# Patient Record
Sex: FEMALE | Race: WHITE | Employment: FULL TIME | URBAN - NONMETROPOLITAN AREA
[De-identification: names, ages, dates, MRNs, and addresses within clinical notes are randomized per-mention and may not be internally consistent; named-entity substitution may affect disease eponyms.]

---

## 2022-02-01 ENCOUNTER — TELEPHONE (OUTPATIENT)
Dept: PRIMARY CARE CLINIC | Age: 37
End: 2022-02-01

## 2022-02-01 NOTE — TELEPHONE ENCOUNTER
----- Message from Tiarra Parekh sent at 2022  8:21 AM EST -----  Subject: Appointment Request    Reason for Call: New Patient Request Appointment    QUESTIONS  Type of Appointment? New Patient/New to Provider  Reason for appointment request? No appointments available during search  Additional Information for Provider? Patient and her  would like to   establish care with Pamela Armando. Please advise. Thanks  ---------------------------------------------------------------------------  --------------  Yarelis Yeh INFO  What is the best way for the office to contact you? OK to leave message on   voicemail  Preferred Call Back Phone Number? [phone]  ---------------------------------------------------------------------------  --------------  SCRIPT ANSWERS  Relationship to Patient? Self  Specialty Confirmation? Primary Care  Is this the first appointment to establish care for a ? No  Have you been diagnosed with, awaiting test results for, or told that you   are suspected of having COVID-19 (Coronavirus)? (If patient has tested   negative or was tested as a requirement for work, school, or travel and   not based on symptoms, answer no)? No  Within the past two weeks have you developed any of the following symptoms   (answer no if symptoms have been present longer than 2 weeks or began   more than 2 weeks ago)? Fever or Chills, Cough, Shortness of breath or   difficulty breathing, Loss of taste or smell, Sore throat, Nasal   congestion, Sneezing or runny nose, Fatigue or generalized body aches   (answer no if pain is specific to a body part e.g. back pain), Diarrhea,   Headache?  Yes

## 2022-02-24 ENCOUNTER — OFFICE VISIT (OUTPATIENT)
Dept: PRIMARY CARE CLINIC | Age: 37
End: 2022-02-24
Payer: COMMERCIAL

## 2022-02-24 VITALS
BODY MASS INDEX: 24.2 KG/M2 | WEIGHT: 163.4 LBS | OXYGEN SATURATION: 98 % | DIASTOLIC BLOOD PRESSURE: 78 MMHG | RESPIRATION RATE: 18 BRPM | SYSTOLIC BLOOD PRESSURE: 112 MMHG | TEMPERATURE: 99 F | HEIGHT: 69 IN | HEART RATE: 77 BPM

## 2022-02-24 DIAGNOSIS — Z76.89 ENCOUNTER TO ESTABLISH CARE: Primary | ICD-10-CM

## 2022-02-24 DIAGNOSIS — U09.9 POST-COVID CHRONIC COUGH: ICD-10-CM

## 2022-02-24 DIAGNOSIS — R05.3 POST-COVID CHRONIC COUGH: ICD-10-CM

## 2022-02-24 PROCEDURE — 99204 OFFICE O/P NEW MOD 45 MIN: CPT | Performed by: NURSE PRACTITIONER

## 2022-02-24 RX ORDER — PREDNISONE 10 MG/1
TABLET ORAL
Qty: 54 TABLET | Refills: 0 | Status: SHIPPED | OUTPATIENT
Start: 2022-02-24

## 2022-02-24 RX ORDER — BENZONATATE 200 MG/1
200 CAPSULE ORAL 3 TIMES DAILY PRN
Qty: 30 CAPSULE | Refills: 0 | Status: SHIPPED | OUTPATIENT
Start: 2022-02-24 | End: 2022-03-03

## 2022-02-24 SDOH — ECONOMIC STABILITY: FOOD INSECURITY: WITHIN THE PAST 12 MONTHS, THE FOOD YOU BOUGHT JUST DIDN'T LAST AND YOU DIDN'T HAVE MONEY TO GET MORE.: NEVER TRUE

## 2022-02-24 SDOH — ECONOMIC STABILITY: FOOD INSECURITY: WITHIN THE PAST 12 MONTHS, YOU WORRIED THAT YOUR FOOD WOULD RUN OUT BEFORE YOU GOT MONEY TO BUY MORE.: NEVER TRUE

## 2022-02-24 ASSESSMENT — SOCIAL DETERMINANTS OF HEALTH (SDOH): HOW HARD IS IT FOR YOU TO PAY FOR THE VERY BASICS LIKE FOOD, HOUSING, MEDICAL CARE, AND HEATING?: NOT HARD AT ALL

## 2022-02-24 ASSESSMENT — ENCOUNTER SYMPTOMS
SHORTNESS OF BREATH: 1
EYES NEGATIVE: 1
COUGH: 1
ALLERGIC/IMMUNOLOGIC NEGATIVE: 1
WHEEZING: 0

## 2022-02-24 ASSESSMENT — PATIENT HEALTH QUESTIONNAIRE - PHQ9
2. FEELING DOWN, DEPRESSED OR HOPELESS: 0
SUM OF ALL RESPONSES TO PHQ9 QUESTIONS 1 & 2: 0
SUM OF ALL RESPONSES TO PHQ QUESTIONS 1-9: 0
1. LITTLE INTEREST OR PLEASURE IN DOING THINGS: 0
SUM OF ALL RESPONSES TO PHQ QUESTIONS 1-9: 0

## 2022-02-24 NOTE — PROGRESS NOTES
[facility]  Sully Mcconnell, [address] [facility]  [address]  Dept: [phone]  Dept Fax: [phone]      Name: Kalani Jones  : 1985         Chief Complaint:     Chief Complaint   Patient presents with    New Patient     Establish care.  Cough     c/o ongoing cough from COVID back in January. Seeking Prenisone. History of Present Illness:      Kalani Jones is a 40 y.o.  female who presents with New Patient (Establish care. ) and Cough (c/o ongoing cough from COVID back in January. Seeking Prenisone. )    Dayna Crowley is here today to establish care. She is concerned about having Covid back in January. She continues to have a nagging cough. Yesterday she coughed so hard she vomited. She states that she still does not have her taste and smell back. States she is short of breath with activity. She has been taking Mucinex. She has some ear pain. She denies any other concerns today. Past Medical History:     History reviewed. No pertinent past medical history. Reviewed all health maintenance requirements and ordered appropriate tests  Health Maintenance Due   Topic Date Due    Depression Screen  Never done       Past Surgical History:     History reviewed. No pertinent surgical history. Medications:       Prior to Admission medications    Medication Sig Start Date End Date Taking? Authorizing Provider   levonorgestrel (MIRENA, 52 MG,) IUD 52 mg 1 each by IntraUTERine route once   Yes Historical Provider, MD   predniSONE (DELTASONE) 10 MG tablet Day 1-4: Take 6 tablets once a day. Day 5-6: Take 5 tablets once a day. Day 7-8: Take 4 tablets once a day. Day 9-10: Take 3 tablets once a day. Day 11-12: Take 2 tablets once a day. Day 13-14: Take 1 tablet once a day.  22  Yes STEPHANI Dougherty CNP   benzonatate (TESSALON) 200 MG capsule Take 1 capsule by mouth 3 times daily as needed for Cough 2/24/22 3/3/22 Yes Kadie Rendon APRN - CNP        Allergies:       Patient has no known allergies. Social History:     Tobacco:    reports that she has quit smoking. She has never used smokeless tobacco.  Alcohol:      reports current alcohol use. Drug Use:  reports no history of drug use. Family History:     History reviewed. No pertinent family history. Review of Systems:     Positive and Negative as described in HPI    Review of Systems   Constitutional: Negative. HENT: Positive for ear pain. Eyes: Negative. Respiratory: Positive for cough and shortness of breath. Negative for wheezing. Cardiovascular: Negative. Endocrine: Negative. Genitourinary: Negative. Musculoskeletal: Negative. Allergic/Immunologic: Negative. Neurological: Negative. Hematological: Negative. Psychiatric/Behavioral: Negative. Physical Exam:   Vitals:  /78   Pulse 77   Temp 99 °F (37.2 °C) (Temporal)   Resp 18   Ht 5' 9\" (1.753 m)   Wt 163 lb 6.4 oz (74.1 kg)   SpO2 98%   BMI 24.13 kg/m²     Physical Exam  Vitals and nursing note reviewed. Constitutional:       Appearance: Normal appearance. HENT:      Head: Normocephalic. Right Ear: Tympanic membrane normal.      Left Ear: Tympanic membrane normal.      Nose: Nose normal.      Mouth/Throat:      Mouth: Mucous membranes are moist.      Pharynx: Oropharynx is clear. Eyes:      Conjunctiva/sclera: Conjunctivae normal.      Pupils: Pupils are equal, round, and reactive to light. Cardiovascular:      Rate and Rhythm: Normal rate and regular rhythm. Heart sounds: Normal heart sounds. No murmur heard. Pulmonary:      Effort: Pulmonary effort is normal. No respiratory distress. Breath sounds: Normal breath sounds. No wheezing. Comments: Harsh cough with deep inspiration  Musculoskeletal:         General: Normal range of motion. Cervical back: Normal range of motion. Skin:     General: Skin is warm.       Capillary Refill: Capillary 2/24/2023).

## 2022-02-24 NOTE — PATIENT INSTRUCTIONS
SURVEY:     You may be receiving a survey from [employer] regarding your visit today. Please complete the survey to enable us to provide the highest quality of care to you and your family. If you cannot score us a very good on any question, please call the office to discuss how we could have made your experience a very good one. Thank you.   Tete Keane, APRN-MARIO Hawthorne Ace, CNP  Jerica Messina, LPN  Dada Baxter, DINORAHN  Judith Yanes, ANAID Dietz, ANAID Fermin, CMA  Olive, PCA

## 2022-12-08 DIAGNOSIS — B96.89 BACTERIAL CONJUNCTIVITIS OF BOTH EYES: Primary | ICD-10-CM

## 2022-12-08 DIAGNOSIS — H10.9 BACTERIAL CONJUNCTIVITIS OF BOTH EYES: Primary | ICD-10-CM

## 2022-12-08 RX ORDER — POLYMYXIN B SULFATE AND TRIMETHOPRIM 1; 10000 MG/ML; [USP'U]/ML
1 SOLUTION OPHTHALMIC EVERY 4 HOURS
Qty: 10 ML | Refills: 0 | Status: SHIPPED | OUTPATIENT
Start: 2022-12-08 | End: 2022-12-15

## 2023-02-27 ENCOUNTER — OFFICE VISIT (OUTPATIENT)
Dept: PRIMARY CARE CLINIC | Age: 38
End: 2023-02-27
Payer: COMMERCIAL

## 2023-02-27 VITALS
HEART RATE: 81 BPM | HEIGHT: 69 IN | WEIGHT: 153 LBS | DIASTOLIC BLOOD PRESSURE: 72 MMHG | RESPIRATION RATE: 18 BRPM | BODY MASS INDEX: 22.66 KG/M2 | SYSTOLIC BLOOD PRESSURE: 112 MMHG | OXYGEN SATURATION: 99 % | TEMPERATURE: 99 F

## 2023-02-27 DIAGNOSIS — Z00.00 WELLNESS EXAMINATION: Primary | ICD-10-CM

## 2023-02-27 DIAGNOSIS — Z13.220 LIPID SCREENING: ICD-10-CM

## 2023-02-27 DIAGNOSIS — R53.83 OTHER FATIGUE: ICD-10-CM

## 2023-02-27 DIAGNOSIS — L30.9 DERMATITIS: ICD-10-CM

## 2023-02-27 PROCEDURE — 99214 OFFICE O/P EST MOD 30 MIN: CPT | Performed by: NURSE PRACTITIONER

## 2023-02-27 RX ORDER — PIMECROLIMUS 10 MG/G
CREAM TOPICAL 2 TIMES DAILY
Qty: 60 G | Refills: 0 | Status: SHIPPED | OUTPATIENT
Start: 2023-02-27 | End: 2023-03-06

## 2023-02-27 SDOH — ECONOMIC STABILITY: FOOD INSECURITY: WITHIN THE PAST 12 MONTHS, THE FOOD YOU BOUGHT JUST DIDN'T LAST AND YOU DIDN'T HAVE MONEY TO GET MORE.: NEVER TRUE

## 2023-02-27 SDOH — ECONOMIC STABILITY: HOUSING INSECURITY
IN THE LAST 12 MONTHS, WAS THERE A TIME WHEN YOU DID NOT HAVE A STEADY PLACE TO SLEEP OR SLEPT IN A SHELTER (INCLUDING NOW)?: NO

## 2023-02-27 SDOH — ECONOMIC STABILITY: INCOME INSECURITY: HOW HARD IS IT FOR YOU TO PAY FOR THE VERY BASICS LIKE FOOD, HOUSING, MEDICAL CARE, AND HEATING?: NOT HARD AT ALL

## 2023-02-27 SDOH — ECONOMIC STABILITY: FOOD INSECURITY: WITHIN THE PAST 12 MONTHS, YOU WORRIED THAT YOUR FOOD WOULD RUN OUT BEFORE YOU GOT MONEY TO BUY MORE.: NEVER TRUE

## 2023-02-27 ASSESSMENT — PATIENT HEALTH QUESTIONNAIRE - PHQ9
2. FEELING DOWN, DEPRESSED OR HOPELESS: 0
1. LITTLE INTEREST OR PLEASURE IN DOING THINGS: 0
SUM OF ALL RESPONSES TO PHQ9 QUESTIONS 1 & 2: 0
SUM OF ALL RESPONSES TO PHQ QUESTIONS 1-9: 0

## 2023-02-27 ASSESSMENT — ENCOUNTER SYMPTOMS
GASTROINTESTINAL NEGATIVE: 1
RESPIRATORY NEGATIVE: 1
EYES NEGATIVE: 1

## 2023-02-27 NOTE — PROGRESS NOTES
[facility]  Daiana Hurt, [address] [facility]  [address]  Dept: [phone]  Dept Fax: [phone]      Name: Nori Leventhal  : 1985         Chief Complaint:     Chief Complaint   Patient presents with    Otalgia    Fatigue     Patient is wanting a CBC and thyroid checked        History of Present Illness:      Nori Leventhal is a 41 y.o.  female who presents with Otalgia and Fatigue (Patient is wanting a CBC and thyroid checked )    LEI Siegel is here today for a routine office visit. She states she has been having increased fatigue and feels like she can sleep all the time. She has been taking vitamin supplements. She also has been having increased dry patches of skin on her face. She states she feels like she has chills a lot. Denies any family history of thyroid disease but would like her thyroid checked. She is using OTC moisturizers that she states will help the symptoms but the dryness continues. She feels like she has fluid in her ears since having Covid. She has taken Benadryl and sometimes will take Zyrtec with no improvement. Past Medical History:     No past medical history on file. Reviewed all health maintenance requirements and ordered appropriate tests  Health Maintenance Due   Topic Date Due    Depression Screen  2023       Past Surgical History:     No past surgical history on file. Medications:       Prior to Admission medications    Medication Sig Start Date End Date Taking? Authorizing Provider   pimecrolimus (ELIDEL) 1 % cream Apply topically 2 times daily for 7 days Apply topically 2 times daily. 2/27/23 3/6/23 Yes STEPHANI Herbert - CNP   levonorgestrel (MIRENA, 52 MG,) IUD 52 mg 1 each by IntraUTERine route once   Yes Historical Provider, MD        Allergies:       Patient has no known allergies. Social History:     Tobacco:    reports that she has quit smoking.  She has never used smokeless tobacco.  Alcohol:      reports current alcohol use. Drug Use:  reports no history of drug use. Family History:     No family history on file. Review of Systems:     Positive and Negative as described in HPI    Review of Systems   Constitutional:  Positive for fatigue. Negative for fever. HENT: Negative. Eyes: Negative. Respiratory: Negative. Cardiovascular: Negative. Gastrointestinal: Negative. Endocrine: Positive for cold intolerance. Genitourinary: Negative. Musculoskeletal: Negative. Skin:  Positive for rash. Allergic/Immunologic: Positive for environmental allergies. Neurological: Negative. Hematological: Negative. Psychiatric/Behavioral: Negative. Physical Exam:   Vitals:  /72 (Site: Left Upper Arm, Position: Sitting)   Pulse 81   Temp 99 °F (37.2 °C) (Temporal)   Resp 18   Ht 5' 9\" (1.753 m)   Wt 153 lb (69.4 kg)   SpO2 99%   BMI 22.59 kg/m²     Physical Exam  Vitals and nursing note reviewed. Constitutional:       General: She is not in acute distress. Appearance: Normal appearance. She is normal weight. HENT:      Head: Normocephalic. Right Ear: Tympanic membrane normal.      Left Ear: Tympanic membrane normal.      Ears:      Comments: Clear fluid bilaterally     Nose: Nose normal.      Mouth/Throat:      Lips: Pink. Mouth: Mucous membranes are moist.      Pharynx: Oropharynx is clear. Eyes:      Extraocular Movements: Extraocular movements intact. Conjunctiva/sclera: Conjunctivae normal.      Pupils: Pupils are equal, round, and reactive to light. Cardiovascular:      Rate and Rhythm: Normal rate and regular rhythm. Heart sounds: Normal heart sounds. No murmur heard. Pulmonary:      Effort: Pulmonary effort is normal.      Breath sounds: Normal breath sounds. Musculoskeletal:         General: Normal range of motion. Cervical back: Normal range of motion and neck supple. No tenderness.    Lymphadenopathy: Cervical: No cervical adenopathy. Skin:     General: Skin is warm. Capillary Refill: Capillary refill takes less than 2 seconds. Comments: Small patch of erythema and dryness on right side of nose fold. Neurological:      General: No focal deficit present. Mental Status: She is alert and oriented to person, place, and time. Psychiatric:         Mood and Affect: Mood normal.         Behavior: Behavior normal.         Thought Content: Thought content normal.         Judgment: Judgment normal.       Data:     No results found for: NA, K, CL, CO2, BUN, CREATININE, GLUCOSE, PROT, LABALBU, BILITOT, ALKPHOS, AST, ALT  No results found for: WBC, RBC, HGB, HCT, MCV, MCH, MCHC, RDW, PLT, MPV  No results found for: TSH  No results found for: CHOL, LDL, HDL, PSA, LABA1C    Assessment/Plan:      Diagnosis Orders   1. Wellness examination        2. Other fatigue  TSH With Reflex Ft4    Vitamin D 25 Hydroxy    Vitamin B12 & Folate    Iron and TIBC    Lipid Panel    CBC    Comprehensive Metabolic Panel, Fasting      3. Dermatitis  pimecrolimus (ELIDEL) 1 % cream      4. Lipid screening  Lipid Panel        Labs ordered and will call with results and further recommendation and treatment. Discussed using moisturizing cream.  May use Elidel as prescribed twice a day. 1.  Deidra received counseling on the following healthy behaviors: nutrition, exercise, and medication adherence  2. Patient given educational materials - see patient instructions  3. Was a self-tracking handout given in paper form or via [employer]? No  If yes, see orders or list here. 4.  Discussed use, benefit, and side effects of prescribed medications. Barriers to medication compliance addressed. All patient questions answered. Pt voiced understanding. 5.  Reviewed prior labs and health maintenance  6. Continue current medications, diet and exercise.     Completed Refills   Requested Prescriptions     Signed Prescriptions Disp Refills    pimecrolimus (ELIDEL) 1 % cream 60 g 0     Sig: Apply topically 2 times daily for 7 days Apply topically 2 times daily. Return in about 1 year (around 2/27/2024).

## 2023-02-27 NOTE — PATIENT INSTRUCTIONS
SURVEY:     You may be receiving a survey from [employer] regarding your visit today. Please complete the survey to enable us to provide the highest quality of care to you and your family. If you cannot score us a very good on any question, please call the office to discuss how we could have made your experience a very good one.      Thank you,    Leticia Keane, APRN-CNP  Rukhsana Granados, APRN-CNP  BARNEY Pierce, CMA  Lai Wright, CMA  Jeny, CMA  Olive, PCA  Reshma, PM

## 2023-03-10 DIAGNOSIS — L30.9 DERMATITIS: Primary | ICD-10-CM

## 2023-03-10 RX ORDER — PIMECROLIMUS 10 MG/G
CREAM TOPICAL
Qty: 30 G | Refills: 1 | Status: SHIPPED | OUTPATIENT
Start: 2023-03-10

## 2023-10-18 ENCOUNTER — TELEPHONE (OUTPATIENT)
Dept: PRIMARY CARE CLINIC | Age: 38
End: 2023-10-18

## 2023-10-18 RX ORDER — PREDNISONE 20 MG/1
20 TABLET ORAL 2 TIMES DAILY
Qty: 10 TABLET | Refills: 0 | Status: SHIPPED | OUTPATIENT
Start: 2023-10-18 | End: 2023-10-23

## 2023-10-18 RX ORDER — AZITHROMYCIN 250 MG/1
250 TABLET, FILM COATED ORAL SEE ADMIN INSTRUCTIONS
Qty: 6 TABLET | Refills: 0 | Status: SHIPPED | OUTPATIENT
Start: 2023-10-18 | End: 2023-10-23

## 2023-10-18 RX ORDER — ALBUTEROL SULFATE 90 UG/1
2 AEROSOL, METERED RESPIRATORY (INHALATION) EVERY 4 HOURS PRN
Qty: 18 G | Refills: 0 | Status: SHIPPED | OUTPATIENT
Start: 2023-10-18 | End: 2023-10-29

## 2023-10-18 NOTE — TELEPHONE ENCOUNTER
Patient contacted the office in regards to having a fever, productive cough with yellow drainage, headache, wheezing, and sinus congestion. Patient took a COVID test and it was negative. Patient wanting an inhaler. Please advise.
Patient notified and verbalized understanding.
Stable

## 2024-03-27 ASSESSMENT — PATIENT HEALTH QUESTIONNAIRE - PHQ9
1. LITTLE INTEREST OR PLEASURE IN DOING THINGS: SEVERAL DAYS
SUM OF ALL RESPONSES TO PHQ QUESTIONS 1-9: 1
SUM OF ALL RESPONSES TO PHQ QUESTIONS 1-9: 1
2. FEELING DOWN, DEPRESSED OR HOPELESS: NOT AT ALL
SUM OF ALL RESPONSES TO PHQ9 QUESTIONS 1 & 2: 1
SUM OF ALL RESPONSES TO PHQ QUESTIONS 1-9: 1
1. LITTLE INTEREST OR PLEASURE IN DOING THINGS: SEVERAL DAYS
SUM OF ALL RESPONSES TO PHQ9 QUESTIONS 1 & 2: 1
SUM OF ALL RESPONSES TO PHQ QUESTIONS 1-9: 1
2. FEELING DOWN, DEPRESSED OR HOPELESS: NOT AT ALL

## 2024-03-28 ENCOUNTER — OFFICE VISIT (OUTPATIENT)
Dept: PRIMARY CARE CLINIC | Age: 39
End: 2024-03-28
Payer: COMMERCIAL

## 2024-03-28 VITALS
TEMPERATURE: 98.4 F | DIASTOLIC BLOOD PRESSURE: 74 MMHG | OXYGEN SATURATION: 97 % | BODY MASS INDEX: 23.7 KG/M2 | SYSTOLIC BLOOD PRESSURE: 118 MMHG | WEIGHT: 160 LBS | HEIGHT: 69 IN | HEART RATE: 78 BPM | RESPIRATION RATE: 16 BRPM

## 2024-03-28 DIAGNOSIS — Z13.220 LIPID SCREENING: ICD-10-CM

## 2024-03-28 DIAGNOSIS — Z13.29 SCREENING FOR HYPOTHYROIDISM: ICD-10-CM

## 2024-03-28 DIAGNOSIS — Z00.00 WELLNESS EXAMINATION: ICD-10-CM

## 2024-03-28 DIAGNOSIS — F41.9 ANXIETY: Primary | ICD-10-CM

## 2024-03-28 PROCEDURE — 99214 OFFICE O/P EST MOD 30 MIN: CPT | Performed by: NURSE PRACTITIONER

## 2024-03-28 RX ORDER — SERTRALINE HYDROCHLORIDE 25 MG/1
25 TABLET, FILM COATED ORAL DAILY
Qty: 30 TABLET | Refills: 0 | Status: SHIPPED | OUTPATIENT
Start: 2024-03-28 | End: 2024-04-27

## 2024-03-28 RX ORDER — ALPRAZOLAM 0.5 MG/1
0.5 TABLET ORAL 3 TIMES DAILY PRN
Qty: 60 TABLET | Refills: 0 | Status: SHIPPED | OUTPATIENT
Start: 2024-03-28 | End: 2024-04-27

## 2024-03-28 SDOH — ECONOMIC STABILITY: FOOD INSECURITY: WITHIN THE PAST 12 MONTHS, THE FOOD YOU BOUGHT JUST DIDN'T LAST AND YOU DIDN'T HAVE MONEY TO GET MORE.: NEVER TRUE

## 2024-03-28 SDOH — ECONOMIC STABILITY: FOOD INSECURITY: WITHIN THE PAST 12 MONTHS, YOU WORRIED THAT YOUR FOOD WOULD RUN OUT BEFORE YOU GOT MONEY TO BUY MORE.: NEVER TRUE

## 2024-03-28 SDOH — ECONOMIC STABILITY: INCOME INSECURITY: HOW HARD IS IT FOR YOU TO PAY FOR THE VERY BASICS LIKE FOOD, HOUSING, MEDICAL CARE, AND HEATING?: NOT HARD AT ALL

## 2024-03-28 ASSESSMENT — ENCOUNTER SYMPTOMS
GASTROINTESTINAL NEGATIVE: 1
EYES NEGATIVE: 1
RESPIRATORY NEGATIVE: 1

## 2024-03-28 NOTE — PATIENT INSTRUCTIONS
SURVEY:     You may be receiving a survey from [facility] [employer] regarding your visit today.     Please complete the survey to enable us to provide the highest quality of care to you and your family.     If you cannot score us a very good on any question, please call the office to discuss how we could have made your experience a very good one.     Thank you,    Philipp Keane, APRN-CNP  Carolyn Contreras, APRN-CNP  Arabella, LPN  Anabelle, CMA  Mitchell, CMA  Jeny, CMA  Olive, PCA  Raiza, CMA  Reshma, PM

## 2024-03-28 NOTE — PROGRESS NOTES
CNP   albuterol sulfate HFA (VENTOLIN HFA) 108 (90 Base) MCG/ACT inhaler Inhale 2 puffs into the lungs every 4 hours as needed for Wheezing or Shortness of Breath 10/18/23 3/28/24 Yes Carolyn Contreras APRN - CNP   levonorgestrel (MIRENA, 52 MG,) IUD 52 mg 1 each by IntraUTERine route once   Yes Provider, MD Marty   pimecrolimus (ELIDEL) 1 % cream Apply topically 2 times daily. 3/10/23   Carolyn Contreras APRN - CNP        Allergies:       Patient has no known allergies.    Social History:     Tobacco:    reports that she has quit smoking. She has never used smokeless tobacco.  Alcohol:      reports current alcohol use.  Drug Use:  reports no history of drug use.    Family History:     Family History   Problem Relation Age of Onset    High Blood Pressure Mother     High Cholesterol Mother     Heart Attack Maternal Grandfather     Cancer Maternal Grandmother     Cancer Paternal Grandmother     Cancer Maternal Aunt        Review of Systems:     Positive and Negative as described in HPI    Review of Systems   Constitutional: Negative.    HENT: Negative.     Eyes: Negative.    Respiratory: Negative.     Cardiovascular: Negative.    Gastrointestinal: Negative.    Endocrine: Negative.    Genitourinary: Negative.    Musculoskeletal: Negative.    Skin: Negative.    Allergic/Immunologic: Positive for environmental allergies.   Neurological: Negative.    Hematological: Negative.    Psychiatric/Behavioral:  Positive for agitation, behavioral problems and dysphoric mood. Negative for sleep disturbance and suicidal ideas. The patient is nervous/anxious.        Physical Exam:   Vitals:  /74   Pulse 78   Temp 98.4 °F (36.9 °C) (Temporal)   Resp 16   Ht 1.753 m (5' 9\")   Wt 72.6 kg (160 lb)   SpO2 97%   BMI 23.63 kg/m²     Physical Exam  Vitals and nursing note reviewed.   Constitutional:       General: She is not in acute distress.     Appearance: Normal appearance. She is normal weight. She is not

## 2024-04-03 DIAGNOSIS — Z00.00 WELLNESS EXAMINATION: ICD-10-CM

## 2024-04-03 DIAGNOSIS — Z13.220 LIPID SCREENING: ICD-10-CM

## 2024-04-03 LAB
ALBUMIN SERPL-MCNC: 4.1 G/DL
ALP BLD-CCNC: 56 U/L
ALT SERPL-CCNC: 20 U/L
AST SERPL-CCNC: 12 U/L
BASOPHILS ABSOLUTE: 0.1 /ΜL
BASOPHILS RELATIVE PERCENT: 0.8 %
BILIRUB SERPL-MCNC: 0.5 MG/DL (ref 0.1–1.4)
BUN BLDV-MCNC: 8 MG/DL
CALCIUM SERPL-MCNC: 9.3 MG/DL
CHLORIDE BLD-SCNC: 102 MMOL/L
CHOLESTEROL, TOTAL: 170 MG/DL
CHOLESTEROL/HDL RATIO: 2.8
CO2: 26.4 MMOL/L
CREAT SERPL-MCNC: 0.85 MG/DL
EOSINOPHILS ABSOLUTE: 0.3 /ΜL
EOSINOPHILS RELATIVE PERCENT: 3.5 %
GLUCOSE FASTING: 95 MG/DL
HCT VFR BLD CALC: 41.5 % (ref 36–46)
HDLC SERPL-MCNC: 60 MG/DL (ref 35–70)
HEMOGLOBIN: 14 G/DL (ref 12–16)
LDL CHOLESTEROL CALCULATED: 93.6 MG/DL (ref 0–160)
LYMPHOCYTES ABSOLUTE: 2 /ΜL
LYMPHOCYTES RELATIVE PERCENT: 20.5 %
MCH RBC QN AUTO: 32.8 PG
MCHC RBC AUTO-ENTMCNC: 33.7 G/DL
MCV RBC AUTO: 97.2 FL
MONOCYTES ABSOLUTE: 0.5 /ΜL
MONOCYTES RELATIVE PERCENT: 5.1 %
NEUTROPHILS ABSOLUTE: 6.6 /ΜL
NEUTROPHILS RELATIVE PERCENT: 69.4 %
NONHDLC SERPL-MCNC: NORMAL MG/DL
PDW BLD-RTO: 12.1 %
PLATELET # BLD: 263 K/ΜL
PMV BLD AUTO: 11.7 FL
POTASSIUM SERPL-SCNC: 3.7 MMOL/L
RBC # BLD: 4.27 10^6/ΜL
SODIUM BLD-SCNC: 139 MMOL/L
TOTAL PROTEIN: 7.7 G/DL (ref 6.4–8.2)
TRIGL SERPL-MCNC: 82 MG/DL
VLDLC SERPL CALC-MCNC: 16.4 MG/DL
WBC # BLD: 9.5 10^3/ML

## 2024-04-04 ENCOUNTER — TELEPHONE (OUTPATIENT)
Dept: PRIMARY CARE CLINIC | Age: 39
End: 2024-04-04

## 2024-04-04 NOTE — TELEPHONE ENCOUNTER
----- Message from STEPHANI Porras - CNP sent at 4/4/2024  8:16 AM EDT -----  Please notify patient of stable lab results.  Thanks Carolyn

## 2024-04-25 DIAGNOSIS — F41.9 ANXIETY: ICD-10-CM

## 2024-04-25 RX ORDER — SERTRALINE HYDROCHLORIDE 25 MG/1
25 TABLET, FILM COATED ORAL DAILY
Qty: 90 TABLET | Refills: 0 | Status: SHIPPED | OUTPATIENT
Start: 2024-04-25 | End: 2024-07-24

## 2024-04-25 NOTE — TELEPHONE ENCOUNTER
Patient contacted the office in regards to wanting a refill of her Zoloft. Patient has a follow up appt with you on 4/29/2024. Patient stated she is doing well on dosage and does not think she needs it increased.     Health Maintenance   Topic Date Due    Cervical cancer screen  Never done    COVID-19 Vaccine (6 - 2023-24 season) 09/01/2023    Hepatitis B vaccine (1 of 3 - 3-dose series) 03/28/2025 (Originally 1985)    Hepatitis C screen  03/28/2025 (Originally 1/1/2004)    HIV screen  03/28/2025 (Originally 1/1/2001)    Depression Screen  03/27/2025    DTaP/Tdap/Td vaccine (2 - Td or Tdap) 08/15/2025    Flu vaccine  Completed    Hepatitis A vaccine  Aged Out    Hib vaccine  Aged Out    HPV vaccine  Aged Out    Polio vaccine  Aged Out    Meningococcal (ACWY) vaccine  Aged Out    Pneumococcal 0-64 years Vaccine  Aged Out    Varicella vaccine  Discontinued             (applicable per patient's age: Cancer Screenings, Depression Screening, Fall Risk Screening, Immunizations)    LDL Calculated (mg/dL)   Date Value   04/03/2024 93.6     AST (U/L)   Date Value   04/03/2024 12     ALT (U/L)   Date Value   04/03/2024 20     BUN (mg/dL)   Date Value   04/03/2024 8.0      (goal A1C is < 7)   (goal LDL is <100) need 30-50% reduction from baseline     BP Readings from Last 3 Encounters:   03/28/24 118/74   02/27/23 112/72   02/24/22 112/78    (goal /80)      All Future Testing planned in [facility]:  Lab Frequency Next Occurrence   TSH With Reflex Ft4 Once 03/28/2024       Next Visit Date:  Future Appointments   Date Time Provider Department Center   4/29/2024  2:00 PM Carolyn Contreras, STEPHANI - CNP [facility]            Patient Active Problem List:     Anxiety state     Heartburn     Mitral valve disorder

## 2024-04-29 ENCOUNTER — TELEMEDICINE (OUTPATIENT)
Dept: PRIMARY CARE CLINIC | Age: 39
End: 2024-04-29
Payer: COMMERCIAL

## 2024-04-29 DIAGNOSIS — F41.9 ANXIETY: Primary | ICD-10-CM

## 2024-04-29 PROCEDURE — 99422 OL DIG E/M SVC 11-20 MIN: CPT | Performed by: NURSE PRACTITIONER

## 2024-04-29 RX ORDER — ALPRAZOLAM 0.5 MG/1
0.5 TABLET ORAL NIGHTLY PRN
COMMUNITY

## 2024-04-29 ASSESSMENT — PATIENT HEALTH QUESTIONNAIRE - PHQ9
SUM OF ALL RESPONSES TO PHQ QUESTIONS 1-9: 0
2. FEELING DOWN, DEPRESSED OR HOPELESS: NOT AT ALL
1. LITTLE INTEREST OR PLEASURE IN DOING THINGS: NOT AT ALL
SUM OF ALL RESPONSES TO PHQ QUESTIONS 1-9: 0
SUM OF ALL RESPONSES TO PHQ QUESTIONS 1-9: 0
SUM OF ALL RESPONSES TO PHQ9 QUESTIONS 1 & 2: 0
SUM OF ALL RESPONSES TO PHQ QUESTIONS 1-9: 0

## 2024-04-29 ASSESSMENT — ENCOUNTER SYMPTOMS
GASTROINTESTINAL NEGATIVE: 1
ALLERGIC/IMMUNOLOGIC NEGATIVE: 1
RESPIRATORY NEGATIVE: 1
EYES NEGATIVE: 1

## 2024-04-29 NOTE — PATIENT INSTRUCTIONS
SURVEY:     You may be receiving a survey from [facility] [employer] regarding your visit today.     Please complete the survey to enable us to provide the highest quality of care to you and your family.     If you cannot score us a very good on any question, please call the office to discuss how we could have made your experience a very good one.     Thank you,    Philipp Keane, APRN-CNP  Carolyn Contreras, APRN-CNP  Arabella, LPN  Anabelle, CMA  Mitchell, CMA  Jeny, CMA  Olive, PCA  Raiza, CMA  Reshma, PM

## 2024-04-29 NOTE — PROGRESS NOTES
Deidra Davis, was evaluated through a synchronous (real-time) audio-video encounter. The patient (or guardian if applicable) is aware that this is a billable service, which includes applicable co-pays. This Virtual Visit was conducted with patient's (and/or legal guardian's) consent. Patient identification was verified, and a caregiver was present when appropriate.   The patient was located at Other: [facility] [employer] (employer)  Provider was located at Facility (Appt Dept): [address] [place] [id number]  Confirm you are appropriately licensed, registered, or certified to deliver care in the state where the patient is located as indicated above. If you are not or unsure, please re-schedule the visit: Yes, I confirm.     Deidra Davis (:  1985) is a Established patient, presenting virtually for evaluation of the following: Anxiety. She states that the Zoloft  has made her feel better.  She no longer feels like she is worrying all the time.  She states the facial tick did go away.  The nail biting continues but has lessened in intensity.  She did use the Xanax  3 times and when she used them they did help.  She states improvement in her agitation and mood swings as well.    Assessment & Plan   Below is the assessment and plan developed based on review of pertinent history, physical exam, labs, studies, and medications.  1. Anxiety    No follow-ups on file.       Subjective   HPI  Review of Systems   Constitutional: Negative.    HENT: Negative.     Eyes: Negative.    Respiratory: Negative.     Cardiovascular: Negative.    Gastrointestinal: Negative.    Endocrine: Negative.    Genitourinary: Negative.    Musculoskeletal: Negative.    Skin: Negative.    Allergic/Immunologic: Negative.    Neurological: Negative.    Hematological: Negative.    Psychiatric/Behavioral:  Positive for agitation and dysphoric mood. The patient is nervous/anxious.           Objective   Patient-Reported Vitals  No

## 2024-07-15 DIAGNOSIS — F41.9 ANXIETY: ICD-10-CM

## 2024-07-15 RX ORDER — SERTRALINE HYDROCHLORIDE 25 MG/1
25 TABLET, FILM COATED ORAL DAILY
Qty: 90 TABLET | Refills: 0 | OUTPATIENT
Start: 2024-07-15 | End: 2024-10-13

## 2024-07-16 DIAGNOSIS — F41.9 ANXIETY: ICD-10-CM

## 2024-07-16 RX ORDER — SERTRALINE HYDROCHLORIDE 25 MG/1
25 TABLET, FILM COATED ORAL DAILY
Qty: 90 TABLET | Refills: 0 | Status: SHIPPED | OUTPATIENT
Start: 2024-07-16 | End: 2024-07-16 | Stop reason: SDUPTHER

## 2024-07-16 RX ORDER — SERTRALINE HYDROCHLORIDE 25 MG/1
25 TABLET, FILM COATED ORAL DAILY
Qty: 90 TABLET | Refills: 0 | Status: SHIPPED | OUTPATIENT
Start: 2024-07-16 | End: 2024-07-17 | Stop reason: SDUPTHER

## 2024-07-16 NOTE — TELEPHONE ENCOUNTER
Health Maintenance   Topic Date Due    Cervical cancer screen  Never done    COVID-19 Vaccine (6 - 2023-24 season) 09/01/2023    Hepatitis B vaccine (1 of 3 - 3-dose series) 03/28/2025 (Originally 1985)    Hepatitis C screen  03/28/2025 (Originally 1/1/2004)    HIV screen  03/28/2025 (Originally 1/1/2001)    Flu vaccine (1) 08/01/2024    Depression Screen  04/29/2025    DTaP/Tdap/Td vaccine (2 - Td or Tdap) 08/15/2025    Hepatitis A vaccine  Aged Out    Hib vaccine  Aged Out    HPV vaccine  Aged Out    Polio vaccine  Aged Out    Meningococcal (ACWY) vaccine  Aged Out    Pneumococcal 0-64 years Vaccine  Aged Out    Varicella vaccine  Discontinued             (applicable per patient's age: Cancer Screenings, Depression Screening, Fall Risk Screening, Immunizations)    AST (U/L)   Date Value   04/03/2024 12     ALT (U/L)   Date Value   04/03/2024 20     BUN (mg/dL)   Date Value   04/03/2024 8.0      (goal A1C is < 7)   (goal LDL is <100) need 30-50% reduction from baseline     BP Readings from Last 3 Encounters:   03/28/24 118/74   02/27/23 112/72   02/24/22 112/78    (goal /80)      All Future Testing planned in [facility]:  Lab Frequency Next Occurrence   TSH With Reflex Ft4 Once 03/28/2024       Next Visit Date:  No future appointments.         Patient Active Problem List:     Anxiety state     Heartburn     Mitral valve disorder

## 2024-07-17 DIAGNOSIS — F41.9 ANXIETY: ICD-10-CM

## 2024-07-17 RX ORDER — SERTRALINE HYDROCHLORIDE 25 MG/1
25 TABLET, FILM COATED ORAL DAILY
Qty: 90 TABLET | Refills: 1 | Status: SHIPPED | OUTPATIENT
Start: 2024-07-17 | End: 2024-10-15

## 2024-10-09 DIAGNOSIS — F41.9 ANXIETY: ICD-10-CM

## 2024-10-10 RX ORDER — SERTRALINE HYDROCHLORIDE 25 MG/1
25 TABLET, FILM COATED ORAL DAILY
Qty: 90 TABLET | Refills: 1 | Status: SHIPPED | OUTPATIENT
Start: 2024-10-10 | End: 2025-04-08

## 2024-10-10 NOTE — TELEPHONE ENCOUNTER
Health Maintenance   Topic Date Due    Cervical cancer screen  Never done    Flu vaccine (1) 08/01/2024    COVID-19 Vaccine (6 - 2023-24 season) 09/01/2024    Hepatitis B vaccine (1 of 3 - 19+ 3-dose series) 03/28/2025 (Originally 1/1/2005)    Hepatitis C screen  03/28/2025 (Originally 1/1/2004)    HIV screen  03/28/2025 (Originally 1/1/2001)    Depression Screen  04/29/2025    DTaP/Tdap/Td vaccine (2 - Td or Tdap) 08/15/2025    Hepatitis A vaccine  Aged Out    Hib vaccine  Aged Out    HPV vaccine  Aged Out    Polio vaccine  Aged Out    Meningococcal (ACWY) vaccine  Aged Out    Pneumococcal 0-64 years Vaccine  Aged Out    Varicella vaccine  Discontinued             (applicable per patient's age: Cancer Screenings, Depression Screening, Fall Risk Screening, Immunizations)    AST (U/L)   Date Value   04/03/2024 12     ALT (U/L)   Date Value   04/03/2024 20     BUN (mg/dL)   Date Value   04/03/2024 8.0      (goal A1C is < 7)   (goal LDL is <100) need 30-50% reduction from baseline     BP Readings from Last 3 Encounters:   03/28/24 118/74   02/27/23 112/72   02/24/22 112/78    (goal /80)      All Future Testing planned in [facility]:  Lab Frequency Next Occurrence   TSH With Reflex Ft4 Once 03/28/2024       Next Visit Date:  No future appointments.         Patient Active Problem List:     Anxiety state     Heartburn     Mitral valve disorder

## 2024-11-03 DIAGNOSIS — J18.9 COMMUNITY ACQUIRED PNEUMONIA, UNSPECIFIED LATERALITY: Primary | ICD-10-CM

## 2024-11-03 RX ORDER — AZITHROMYCIN 250 MG/1
TABLET, FILM COATED ORAL
Qty: 6 TABLET | Refills: 0 | Status: SHIPPED | OUTPATIENT
Start: 2024-11-03 | End: 2024-11-03

## 2024-11-03 RX ORDER — PREDNISONE 20 MG/1
20 TABLET ORAL 2 TIMES DAILY
Qty: 10 TABLET | Refills: 0 | Status: SHIPPED | OUTPATIENT
Start: 2024-11-03 | End: 2024-11-08

## 2024-11-03 RX ORDER — AMOXICILLIN 875 MG/1
875 TABLET, COATED ORAL 2 TIMES DAILY
Qty: 20 TABLET | Refills: 0 | Status: SHIPPED | OUTPATIENT
Start: 2024-11-03 | End: 2024-11-13

## 2024-11-03 RX ORDER — AMOXICILLIN 875 MG/1
875 TABLET, COATED ORAL 2 TIMES DAILY
Qty: 20 TABLET | Refills: 0 | Status: SHIPPED | OUTPATIENT
Start: 2024-11-03 | End: 2024-11-03

## 2024-11-03 RX ORDER — PREDNISONE 20 MG/1
20 TABLET ORAL 2 TIMES DAILY
Qty: 10 TABLET | Refills: 0 | Status: SHIPPED | OUTPATIENT
Start: 2024-11-03 | End: 2024-11-03

## 2024-11-03 RX ORDER — AZITHROMYCIN 250 MG/1
TABLET, FILM COATED ORAL
Qty: 6 TABLET | Refills: 0 | Status: SHIPPED | OUTPATIENT
Start: 2024-11-03 | End: 2024-11-13

## 2025-02-18 ENCOUNTER — PATIENT MESSAGE (OUTPATIENT)
Dept: PRIMARY CARE CLINIC | Age: 40
End: 2025-02-18

## 2025-02-18 DIAGNOSIS — F41.9 ANXIETY: ICD-10-CM

## 2025-04-19 ASSESSMENT — PATIENT HEALTH QUESTIONNAIRE - PHQ9
1. LITTLE INTEREST OR PLEASURE IN DOING THINGS: NOT AT ALL
1. LITTLE INTEREST OR PLEASURE IN DOING THINGS: NOT AT ALL
SUM OF ALL RESPONSES TO PHQ QUESTIONS 1-9: 0
SUM OF ALL RESPONSES TO PHQ9 QUESTIONS 1 & 2: 0
SUM OF ALL RESPONSES TO PHQ QUESTIONS 1-9: 0
SUM OF ALL RESPONSES TO PHQ QUESTIONS 1-9: 0
2. FEELING DOWN, DEPRESSED OR HOPELESS: NOT AT ALL
2. FEELING DOWN, DEPRESSED OR HOPELESS: NOT AT ALL
SUM OF ALL RESPONSES TO PHQ QUESTIONS 1-9: 0

## 2025-04-21 ENCOUNTER — OFFICE VISIT (OUTPATIENT)
Dept: PRIMARY CARE CLINIC | Age: 40
End: 2025-04-21
Payer: COMMERCIAL

## 2025-04-21 VITALS
BODY MASS INDEX: 24.76 KG/M2 | RESPIRATION RATE: 16 BRPM | DIASTOLIC BLOOD PRESSURE: 78 MMHG | TEMPERATURE: 98.7 F | HEIGHT: 69 IN | SYSTOLIC BLOOD PRESSURE: 114 MMHG | HEART RATE: 61 BPM | WEIGHT: 167.2 LBS | OXYGEN SATURATION: 98 %

## 2025-04-21 DIAGNOSIS — Z00.00 ENCOUNTER FOR WELL ADULT EXAM WITHOUT ABNORMAL FINDINGS: Primary | ICD-10-CM

## 2025-04-21 DIAGNOSIS — F41.9 ANXIETY: ICD-10-CM

## 2025-04-21 PROCEDURE — 99395 PREV VISIT EST AGE 18-39: CPT | Performed by: NURSE PRACTITIONER

## 2025-04-21 SDOH — ECONOMIC STABILITY: FOOD INSECURITY: WITHIN THE PAST 12 MONTHS, THE FOOD YOU BOUGHT JUST DIDN'T LAST AND YOU DIDN'T HAVE MONEY TO GET MORE.: NEVER TRUE

## 2025-04-21 SDOH — ECONOMIC STABILITY: FOOD INSECURITY: WITHIN THE PAST 12 MONTHS, YOU WORRIED THAT YOUR FOOD WOULD RUN OUT BEFORE YOU GOT MONEY TO BUY MORE.: NEVER TRUE

## 2025-04-21 ASSESSMENT — ENCOUNTER SYMPTOMS
RESPIRATORY NEGATIVE: 1
EYES NEGATIVE: 1
GASTROINTESTINAL NEGATIVE: 1
ALLERGIC/IMMUNOLOGIC NEGATIVE: 1

## 2025-04-21 NOTE — PATIENT INSTRUCTIONS
SURVEY:     You may be receiving a survey from [employer] regarding your visit today.     Please complete the survey to enable us to provide the highest quality of care to you and your family.     If you cannot score us a very good on any question, please call the office to discuss how we could have made your experience a very good one.     Thank you,    Philipp Keane, APRN-CNP  Carolyn Contreras, APRN-CNP  Arabella, LPN  Anabelle, CMA  Mitchell, CMA  Jeny, CMA  Olive, PCA  Raiza, CMA  Reshma, PM         Well Visit, Ages 18 to 65: Care Instructions  Well visits can help you stay healthy. Your doctor has checked your overall health and may have suggested ways to take good care of yourself. Your doctor also may have recommended tests. You can help prevent illness with healthy eating, good sleep, vaccinations, regular exercise, and other steps.    Get the tests that you and your doctor decide on. Depending on your age and risks, examples might include screening for diabetes; hepatitis C; HIV; and cervical, breast, lung, and colon cancer. Screening helps find diseases before any symptoms appear.   Eat healthy foods. Choose fruits, vegetables, whole grains, lean protein, and low-fat dairy foods. Limit saturated fat and reduce salt.     Limit alcohol. Men should have no more than 2 drinks a day. Women should have no more than 1. For some people, no alcohol is the best choice.   Exercise. Get at least 30 minutes of exercise on most days of the week. Walking can be a good choice.     Reach and stay at your healthy weight. This will lower your risk for many health problems.   Take care of your mental health. Try to stay connected with friends, family, and community, and find ways to manage stress.     If you're feeling depressed or hopeless, talk to someone. A counselor can help. If you don't have a counselor, talk to your doctor.   Talk to your doctor if you think you may have a problem with alcohol or drug use. This includes

## 2025-04-21 NOTE — PROGRESS NOTES
Well Adult Note  Name: Deidra Davis Today’s Date: 2025   MRN: [id number] Sex: Female   Age: 40 y.o. Ethnicity: Non- / Non    : 1985 Race: White (non-)      Deidra Davis is here for a well adult exam.       Assessment & Plan   Encounter for well adult exam without abnormal findings  Anxiety        Return in 1 year (on 2026) for CPE (Physical Exam).       Subjective   History:  Doing well with the Zoloft.  She had lab work drawn today.  No new medical concerns.    Review of Systems   Constitutional: Negative.    HENT: Negative.     Eyes: Negative.    Respiratory: Negative.     Cardiovascular: Negative.    Gastrointestinal: Negative.    Endocrine: Negative.    Genitourinary: Negative.    Musculoskeletal: Negative.    Skin: Negative.    Allergic/Immunologic: Negative.    Neurological: Negative.    Hematological: Negative.    Psychiatric/Behavioral: Negative.         No Known Allergies  Prior to Visit Medications    Medication Sig Taking? Authorizing Provider   sertraline (ZOLOFT) 50 MG tablet Take 1 tablet by mouth daily Yes Carolyn Contreras APRN - CNP   albuterol sulfate HFA (VENTOLIN HFA) 108 (90 Base) MCG/ACT inhaler Inhale 2 puffs into the lungs every 4 hours as needed for Wheezing or Shortness of Breath Yes Carolyn Contreras APRN - CNP   levonorgestrel (MIRENA, 52 MG,) IUD 52 mg 1 each by IntraUTERine route once Yes Marty Starkey MD   ALPRAZolam (XANAX) 0.5 MG tablet Take 1 tablet by mouth nightly as needed for Sleep. Max Daily Amount: 0.5 mg  Patient not taking: Reported on 2025  ProviderMarty MD     Past Medical History:   Diagnosis Date    Anxiety      No past surgical history on file.  Family History   Problem Relation Age of Onset    High Blood Pressure Mother     High Cholesterol Mother     Heart Attack Maternal Grandfather     Atrial Fibrillation Maternal Grandfather     Cancer Maternal Grandmother     Cancer Paternal Grandmother     Cancer

## 2025-08-06 DIAGNOSIS — F41.9 ANXIETY: ICD-10-CM
